# Patient Record
Sex: MALE
[De-identification: names, ages, dates, MRNs, and addresses within clinical notes are randomized per-mention and may not be internally consistent; named-entity substitution may affect disease eponyms.]

---

## 2018-03-04 ENCOUNTER — HOSPITAL ENCOUNTER (EMERGENCY)
Dept: HOSPITAL 14 - H.ER | Age: 28
LOS: 1 days | Discharge: HOME | End: 2018-03-05
Payer: COMMERCIAL

## 2018-03-04 VITALS
OXYGEN SATURATION: 97 % | HEART RATE: 72 BPM | RESPIRATION RATE: 16 BRPM | SYSTOLIC BLOOD PRESSURE: 131 MMHG | DIASTOLIC BLOOD PRESSURE: 80 MMHG | TEMPERATURE: 97.9 F
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
## 2018-03-04 DIAGNOSIS — S93.602A: ICD-10-CM

## 2018-03-04 DIAGNOSIS — S93.402A: Primary | ICD-10-CM
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
## 2018-03-04 NOTE — ED PDOC
Lower Extremity Pain/Injury


Time Seen by Provider: 03/04/18 20:04


Chief Complaint (Nursing): Lower Extremity Problem/Injury


Chief Complaint (Provider): Left foot pain


History Per: Patient


History/Exam Limitations: no limitations


Current Symptoms Are (Timing): Still Present


Additional History Per: Patient


Additional Complaint(s): 


28yo male, presents to ER for evaluation of left foot pain after he twisted it 

while stepping off a curb. He denies any weakness, numbness or tingling. No 

other complaints. 





- Ankle/Foot


Description Of Injury: Twisted





Past Medical History


Reviewed: Historical Data, Nursing Documentation, Vital Signs


Vital Signs: 





 Last Vital Signs











Temp  97.9 F   03/04/18 19:59


 


Pulse  72   03/04/18 19:59


 


Resp  16   03/04/18 19:59


 


BP  131/80   03/04/18 19:59


 


Pulse Ox  97   03/04/18 19:59














- Medical History


PMH: No Chronic Diseases





- Surgical History


Surgical History: No Surg Hx





- Family History


Family History: States: No Known Family Hx





- Allergies


Allergies/Adverse Reactions: 


 Allergies











Allergy/AdvReac Type Severity Reaction Status Date / Time


 


peanut Allergy  ANAPHYLAXIS Verified 03/04/18 19:58














Wells Criteria for PE





- Wells Criteria for Pulmonary Embolism


Clinical Signs and Symptoms of DVT: No


P.E is #1 Diagnosis, or Equally Likely: No


Heart Rate >100: No


Immobilization at least 3 days;Surgery previous 4 weeks: No


Previous, objectively diagnosed PE or DVT: No


Hemoptysis: No


Malignancy w/treatment within 6 months, or palliative: No


Total Score: 0





Review of Systems


Musculoskeletal: Positive for: Foot Pain (left)


Neurological: Negative for: Weakness, Numbness





Physical Exam





- Reviewed


Nursing Documentation Reviewed: Yes


Vital Signs Reviewed: Yes





- Physical Exam


Appears: Positive for: Non-toxic, No Acute Distress


Head Exam: Positive for: ATRAUMATIC, NORMAL INSPECTION, NORMOCEPHALIC


Skin: Positive for: Normal Color


Eye Exam: Positive for: Normal appearance


Neck: Positive for: Supple


Cardiovascular/Chest: Positive for: Regular Rate, Rhythm


Pulses-Dorsalis Pedis (L): 2+


Pulses-Dorsalis Pedis (R): 2+


Extremity: Positive for: Normal ROM, Tenderness (tenderness to left ankle noted)

, Swelling (swelling noted to left foot, patient states he has history of 

stress fractures causing the swelling).  Negative for: Deformity


Neurologic/Psych: Positive for: Alert, Oriented.  Negative for: Motor/Sensory 

Deficits





- ECG


O2 Sat by Pulse Oximetry: 97 (RA)


Pulse Ox Interpretation: Normal





Medical Decision Making


Medical Decision Making: 


Impression: Foot pain s/p injury


Plan:


-- Patient declined pain medicine


-- XR Left foot








Scribe Attestation:


Documented by Helena Cronin acting as a scribe for PRASANNA Beth


Provider Attestation:


All medical record entries made by the Scribe were at my direction and 

personally dictated by me. I have reviewed the chart and agree that the record 

accurately reflects my personal performance of the history, physical exam, 

medical decision making, and the department course for this patient. I have 

also personally directed, reviewed, and agree with the discharge instructions 

and disposition.





Disposition





- Clinical Impression


Clinical Impression: 


 Avulsion fracture of ankle, Ankle sprain








- Patient ED Disposition


Is Patient to be Admitted: No


Counseled Patient/Family Regarding: Diagnosis, Need For Followup





- Disposition


Referrals: 


Junior Elder III, MD [Staff Provider] - 


Disposition: Routine/Home


Disposition Time: 23:46


Condition: GOOD


Instructions:  Ankle Sprain


Forms:  [employer] (English), [facility] ED School/Work Excuse

## 2018-03-04 NOTE — CP.PCM.CON
History of Present Illness





- History of Present Illness


History of Present Illness: 





Ortho Consult Note - Dr. Elder





27M unremarkable PMHx presents to ED complaining of left foot and ankle pain. 

Girlfriend present at bedside. Patient states approximately 3 hours prior to 

presentation, he fell off a curb while trying to catch a bus. Patient states he 

has been able to bear weight to LLE after injury. Patient reports moderate pain 

to the top of his foot and his lateral ankle. Denies numbness or tingling. 

Patient also states he sustained a stress fracture to his 2nd metatarsal back 

in December 2017 but denies any pain to the area. Offers no other complaints. 

Denies N/V/F/D/C/SOB.











Review of Systems





- Review of Systems


All systems: reviewed and no additional remarkable complaints except (as per HPI

)





Past Patient History





- Past Social History


Smoking Status: Never Smoked





- CARDIAC


Hx Cardiac Disorders: No





- PULMONARY


Hx Respiratory Disorders: No





- NEUROLOGICAL


Hx Neurological Disorder: No





- HEENT


Hx HEENT Problems: No





- HEMATOLOGICAL/ONCOLOGICAL


Hx Blood Disorders: No





- PSYCHIATRIC


Hx Substance Use: No





- SURGICAL HISTORY


Hx Surgeries: No





Meds


Allergies/Adverse Reactions: 


 Allergies











Allergy/AdvReac Type Severity Reaction Status Date / Time


 


peanut Allergy  ANAPHYLAXIS Verified 03/04/18 19:58














Physical Exam





- Constitutional


Appears: Well, Non-toxic, No Acute Distress





- Extremities Exam


Additional comments: 





LLE focused physical exam:





VASC: DP and PT pulses palpable 2/4. CFT <3 seconds to digits x5. Temperature 

gradient warm to warm. Non-pitting edema noted to lateral malleolus, 

dorsolateral midfoot





NEURO: Gross sensation intact.





DERM: No open lesions noted. Ecchymosis noted to dorsolateral midfoot.





ORTHO: Pain on palpation ATFL, dorsolateral midfoot. Muscle strength 5/5 for 

all dorsiflexors, plantarflexors, inverters, and everters with pain on 

dorsiflexion. Ankle joint ROM WNL without pain noted.





- Neurological Exam


Neurological exam: Alert, Oriented x3





- Psychiatric Exam


Psychiatric exam: Normal Affect, Normal Mood





Results





- Vital Signs


Recent Vital Signs: 


 Last Vital Signs











Temp  97.9 F   03/04/18 19:59


 


Pulse  72   03/04/18 19:59


 


Resp  16   03/04/18 19:59


 


BP  131/80   03/04/18 19:59


 


Pulse Ox  97   03/04/18 20:41














Assessment & Plan





- Assessment and Plan (Free Text)


Assessment: 


27 year old male patient with left ankle sprain and left talar avulsion 

fracture s/p mechanical fall


Plan: 





Patient seen and evaluated


Discussed with attending, Dr. Elder


Left foot XR reviewed: talar neck avulsion fracture


Posterior splint applied to LLE; patient to be NWB with the assistance of 

crutches


Patient to keep dressing clean/dry/intact until follow up appointment


Recommend RICE therapy


Pain control per ED


Advised patient to follow up with Dr. Elder in office Tuesday, 3/6/18


Stable for discharge per ortho

## 2018-03-05 NOTE — RAD
PROCEDURE:  Left Foot Radiographs.



HISTORY:

lateral foot pian, twisted  



COMPARISON:

None.



FINDINGS:



BONES:

A likely stress fracture of the left 2nd metatarsal bone is 

identified at the distal diametaphysis and an intermediate stage 

healing as prominent callus formation is seen related. Follow-up is 

recommended to resolution as an underlying bone lesion is difficult 

to completely exclude here. 



JOINTS:

No subluxation or dislocation. 



SOFT TISSUES:

Normal. 



OTHER FINDINGS:

None.



IMPRESSION:

Likely stress fracture 2nd metatarsal bone added intermediate stage 

of healing.  Follow-up radiography is advised to resolution. No 

subluxation or dislocation.  Please see discussion above. PA review 

assigned.